# Patient Record
Sex: MALE | Race: WHITE
[De-identification: names, ages, dates, MRNs, and addresses within clinical notes are randomized per-mention and may not be internally consistent; named-entity substitution may affect disease eponyms.]

---

## 2019-03-18 ENCOUNTER — HOSPITAL ENCOUNTER (OUTPATIENT)
Dept: HOSPITAL 95 - ORSCSDS | Age: 34
Discharge: HOME | End: 2019-03-18
Attending: DENTIST
Payer: COMMERCIAL

## 2019-03-18 VITALS — HEIGHT: 65.98 IN | WEIGHT: 124.01 LBS | BODY MASS INDEX: 19.93 KG/M2

## 2019-03-18 DIAGNOSIS — K04.7: ICD-10-CM

## 2019-03-18 DIAGNOSIS — F88: ICD-10-CM

## 2019-03-18 DIAGNOSIS — Q93.51: ICD-10-CM

## 2019-03-18 DIAGNOSIS — K03.81: ICD-10-CM

## 2019-03-18 DIAGNOSIS — K02.9: Primary | ICD-10-CM

## 2019-03-18 PROCEDURE — 0CDWXZ1 EXTRACTION OF UPPER TOOTH, MULTIPLE, EXTERNAL APPROACH: ICD-10-PCS | Performed by: DENTIST

## 2019-03-18 PROCEDURE — 0CDXXZ1 EXTRACTION OF LOWER TOOTH, MULTIPLE, EXTERNAL APPROACH: ICD-10-PCS | Performed by: DENTIST

## 2019-03-18 PROCEDURE — 0CRW0J1 REPLACEMENT OF UPPER TOOTH, MULTIPLE, WITH SYNTHETIC SUBSTITUTE, OPEN APPROACH: ICD-10-PCS | Performed by: DENTIST

## 2019-03-18 PROCEDURE — 0CRX0J1 REPLACEMENT OF LOWER TOOTH, MULTIPLE, WITH SYNTHETIC SUBSTITUTE, OPEN APPROACH: ICD-10-PCS | Performed by: DENTIST

## 2025-03-22 NOTE — NUR
NEW ADMISSION, PT HAS HAD 3 SMALL LIQUID BROWN STOOLS.  [facility].  PT
NONVERBAL AND PULLS AT LINES AND ATTENDS UNLESS LINES ARE HIDDEN UNDER
CLOTHING.  NO S/S OF PAIN OR DISTRESS NOTED DURING THIS SHIFT.  PT INCONTINENT
OF BLADDER AND BOWL.

## 2025-03-22 NOTE — NUR
SHIFT SUMMARY
 
PATIENT ALERT AND ACTIVE.  PATIENT IS NON VERBAL.  MOTHER AND SISTER AT
BEDSIDE AND ASSISTING IN CARE.  PATIENT IS ABLE TO TAKE A FEW STEPS WITH
MOTHERS ASSISTANCE AND TOOK A RIDE AROUND THE UNIT IN WHEELCHAIR.  PATIENT HAD
A LARGE BM AFTER SUPPOSITORY AND ANOTHER LARGE LIQUID STOOL AFTER ENEMA.
AFTER BOWEL MOVEMENTS AND GETTING OUT OF BED FOR A BIT, PATIENT WAS ABLE TO
SLEEP.  PATIENT TOLERATING FOOD.  FAMILY STATES THAT HE PREFERS SWEETER FOOD
LIKE PUDDING.  MOTHER VERBALIZING CONCERNS ABOUT MANANGING CONSTIPATION AT
HOME.  PROVIDED EDUCATION RELATED TO BOWEL PROGRAM.

## 2025-03-22 NOTE — NUR
HOSPITALIST CONTACT
 
PT FAMILY AT BEDSIDE. FAMILY REPORTS PT HAS HX OF EATING NON FOOD ITEMS
COMPULSIVELY--RUBBER, PAPER, PLASTIC, AND LEATHER ITEMS. PT HAS TELE ORDER AND
FAMILY CONCERNED PT WOULD ATTEMPT TO INGEST. CALL TO ON CALL HOSPITALIST.
SPOKE TO DR LOPEZ ABOUT CONCERN. PT DOES NOT HAVE A CARDIAC HX. APPROVED
ORDER TO D/C TELE.  ALSO APPROVED ORDER TO ADD SIPS AND CHIPS AS TOLERATED.

## 2025-03-23 NOTE — NUR
SHIFT SUMMARY
 
PATIENT HAS NOT BEEN ALERT AND ORIENTED FOR ME THIS SHIFT. PATIENT HAS HAD NO
ACUTE EVENTS THIS SHIFT. PATIENT HAS BEEN SLEEPING MOST OF SHIFT. PATIENTS
FAMILY HAS STAYED ALL SHIFT AND WERE HELPFUL WITH CARE. PATIENT HAS HAD NO
ADDITIONAL BMS THIS SHIFT. VITAL SIGNS REVIEWED. PATIENT SPIT UP SOME
ACCORDING TO FAMILY BUT WAS MINIMAL ON DESCRIPTION. BED IN LOCKED AND LOWEST
POSITION. CALL LIGHT IN PLACE.

## 2025-03-23 NOTE — NUR
SHIFT SUMMARY
 
PATIENT ALERT AND INTERACTIVE BUT NON VERBAL.  PATIENT UP OOB AT TIMES AND IN
WHEELCHAIR TO MOBILIZE.  PATIENT HAD A LARGE BM AFTER SUPPOSITORY.  STOOL
GRAINY BUT SOFT.  PATIENT MORE COOPERATIVE WITH STAFF TODAY WITH PERSONAL CARE
NEEDS.  PATIENT EATING WELL.  NO SIGNS OF NAUSEA OR VOMITTING.  MOTHER
CONTINUES TO BE VERY ANXIOUS ABOUT LONG TERM PLAN.  CONTINUE TO PROVIDE
EDUCATION RELATED TO NEED OF REGULAR BOWEL CARE AND PRN OPTIONS WHEN NEEDED.
MOTHER AND SISTER AT BEDSIDE TO ASSIST WITH CARE AS NEEDED.

## 2025-03-23 NOTE — NUR
MET WITH PATIENT AND HIS MOM ABNER. PATIENT IS LAYING IN BED AND APPEARS
COMFORTABLE. ABNER IS HIS CAREGIVER. WE DISCUSSED ADVANCED CARE PLANNING. SHE
APPEARED UNCOMFORTABLE WITH THIS TOPIC AT FIRST AND STATED "THESE ARE HARD
THINGS TO TALK ABOUT". I EXPRESSED UNDERSTANDING AND NORMILIZED HER FEELINGS.
I RELAYED THAT NO DECISIONS HAD TO BE MADE NOW BUT NOT A BAD IDEA TO PREPARE
JUST INCASE THINGS ARRISE. I ENCOURAGED HER TO TALK NOT ONLY ABOUT TOM BUT
HER DAUGHTERS AND HERSELF SO THAT THERE IS AN UNDERSTANDING WHAT EVERYONE
WOULD WANT AND CAN RESPECT EACHOTHERS WISHES IF THINGS ARRISE. I PROVIDED A
POLST, AD, AND A COPY OF "HARD CHOICES FOR LOVING PEOPLE". WE HAD THERAPUTIC
DISCUSSION AND SHARED STORIES OF LOVED ONES AND THEIR MEDICAL JOURNEYS. SHE
EXPRESSED GRATITUDE FOR MY VISIT.

## 2025-03-24 NOTE — NUR
ASSUMPTION OF CARE:
ASSUMED CARE OF PATIENT. CALL LIGHT GOING OFF AND ANSWERED DURING SHIFT-CHANGE
REPORT. PATIENT AWAKE, STANDING WITH MOTHER (ABNER) AND SISTER
(LAURA) HELPING TO CLEAN PATIENT, WHO HAD JUST HAD A LARGE, DARK BROWN, SOFT
STOOL. PLAN OF CARE DISCUSSED. PATIENT SAT IN WHEELCHAIR AND SISTER TOOK HIM
FOR A STROLL AROUND THE UNIT. NO ACUTE NEEDS.

## 2025-03-24 NOTE — NUR
SHIFT SUMMARY
 
PT SLEPT LONG INTERVALS THROUGH THE NIGHT.  AT START OF SHIFT PT NOT WAKING
UP ENOUGH TO TAKE MEDICATIONS, BUT ABLE TO GIVE MEDS LATER IN SHIFT.  PT
NONVERBAL, FAMILY AT BEDSIDE.  NO BM DURING THE SHIFT.  BED IN LOWEST
POSITION, CALL LIGHT WITHIN REACH, SIDERAILS UP X 2.

## 2025-03-24 NOTE — NUR
DISCHARGE SUMMARY:
ALERT. ORIENTED, THOUGH DIFFICULT TO DISCERN SECONDARY TO COGNITIVE
IMPAIRMENT. PLEASANT AND COOPERATIVE WITH CARE, PROVIDED MOSTLY BY MOTHER,
ABNER, AND SISTER, LAURA. 1PA FOR AMBULATION BUT USES WHEELCHAIR WITH
ASSISTANCE AT BASELINE. CAN SELF-PROPEL IN WHEELCHAIR. MEDS WHOLE c CHOCOLATE
PUDDING. SHOWERED TODAY. LARGE BM AT BEGINNING OF SHIFT.
MEDICATIONS FAXED TO [facility]. INSTRUCTED TO FOLLOW-UP WITH PROVIDERS AS
ORDERED. LEFT FLOOR WITH ALL BELONGINGS AND DISCHARGE PACKET IN HIS PERSONAL
WHEELCHAIR, ESCORTED BY SISTER AND MOTHER, PROVIDING TRANSPORTATION.